# Patient Record
Sex: FEMALE | Race: WHITE | NOT HISPANIC OR LATINO | Employment: FULL TIME | URBAN - METROPOLITAN AREA
[De-identification: names, ages, dates, MRNs, and addresses within clinical notes are randomized per-mention and may not be internally consistent; named-entity substitution may affect disease eponyms.]

---

## 2022-04-20 ENCOUNTER — LAB REQUISITION (OUTPATIENT)
Dept: LAB | Facility: CLINIC | Age: 32
End: 2022-04-20

## 2022-04-20 LAB
MEV IGG SER IA-ACNC: 22.5 AU/ML
MEV IGG SER IA-ACNC: POSITIVE
RUBV IGG SERPL QL IA: 2.41 INDEX
RUBV IGG SERPL QL IA: POSITIVE
VZV IGG SER QL IA: 1144 INDEX
VZV IGG SER QL IA: POSITIVE

## 2022-04-20 PROCEDURE — 86735 MUMPS ANTIBODY: CPT | Performed by: INTERNAL MEDICINE

## 2022-04-20 PROCEDURE — 86787 VARICELLA-ZOSTER ANTIBODY: CPT | Performed by: INTERNAL MEDICINE

## 2022-04-20 PROCEDURE — 86481 TB AG RESPONSE T-CELL SUSP: CPT | Performed by: INTERNAL MEDICINE

## 2022-04-20 PROCEDURE — 86765 RUBEOLA ANTIBODY: CPT | Performed by: INTERNAL MEDICINE

## 2022-04-20 PROCEDURE — 86762 RUBELLA ANTIBODY: CPT | Performed by: INTERNAL MEDICINE

## 2022-04-21 LAB
MUMPS ANTIBODY IGG INSTRUMENT VALUE: 12.6 AU/ML
MUV IGG SER QL IA: POSITIVE

## 2022-04-22 LAB
QUANTIFERON MITOGEN: 10 IU/ML
QUANTIFERON NIL TUBE: 0.04 IU/ML
QUANTIFERON TB1 TUBE: 0.04 IU/ML
QUANTIFERON TB2 TUBE: 0.05

## 2022-04-24 LAB
GAMMA INTERFERON BACKGROUND BLD IA-ACNC: 0.04 IU/ML
M TB IFN-G BLD-IMP: NEGATIVE
M TB IFN-G CD4+ BCKGRND COR BLD-ACNC: 9.96 IU/ML
MITOGEN IGNF BCKGRD COR BLD-ACNC: 0 IU/ML
MITOGEN IGNF BCKGRD COR BLD-ACNC: 0.01 IU/ML

## 2022-05-29 ENCOUNTER — HEALTH MAINTENANCE LETTER (OUTPATIENT)
Age: 32
End: 2022-05-29

## 2022-07-06 ENCOUNTER — OFFICE VISIT (OUTPATIENT)
Dept: FAMILY MEDICINE | Facility: CLINIC | Age: 32
End: 2022-07-06
Payer: COMMERCIAL

## 2022-07-06 VITALS
DIASTOLIC BLOOD PRESSURE: 74 MMHG | SYSTOLIC BLOOD PRESSURE: 122 MMHG | HEART RATE: 81 BPM | WEIGHT: 242.5 LBS | OXYGEN SATURATION: 99 % | BODY MASS INDEX: 38.06 KG/M2 | HEIGHT: 67 IN

## 2022-07-06 DIAGNOSIS — E03.9 HYPOTHYROIDISM, UNSPECIFIED TYPE: ICD-10-CM

## 2022-07-06 DIAGNOSIS — R73.01 IMPAIRED FASTING GLUCOSE: ICD-10-CM

## 2022-07-06 DIAGNOSIS — F33.42 RECURRENT MAJOR DEPRESSION IN COMPLETE REMISSION (H): ICD-10-CM

## 2022-07-06 DIAGNOSIS — E66.01 MORBID OBESITY (H): Primary | ICD-10-CM

## 2022-07-06 DIAGNOSIS — Z98.84 HISTORY OF WEIGHT LOSS SURGERY: ICD-10-CM

## 2022-07-06 DIAGNOSIS — G43.909 MIGRAINE WITHOUT STATUS MIGRAINOSUS, NOT INTRACTABLE, UNSPECIFIED MIGRAINE TYPE: ICD-10-CM

## 2022-07-06 PROBLEM — Z97.5 USES INTRAUTERINE DEVICE FOR BIRTH CONTROL: Status: ACTIVE | Noted: 2021-03-29

## 2022-07-06 PROBLEM — K21.9 GASTROESOPHAGEAL REFLUX DISEASE: Status: RESOLVED | Noted: 2020-09-02 | Resolved: 2022-07-06

## 2022-07-06 PROBLEM — K21.9 GASTROESOPHAGEAL REFLUX DISEASE: Status: ACTIVE | Noted: 2020-09-02

## 2022-07-06 PROBLEM — D06.9 CARCINOMA IN SITU OF CERVIX, UNSPECIFIED: Status: ACTIVE | Noted: 2018-04-03

## 2022-07-06 LAB
HBA1C MFR BLD: 5.4 % (ref 0–5.6)
TSH SERPL DL<=0.005 MIU/L-ACNC: 5.73 UIU/ML (ref 0.3–4.2)

## 2022-07-06 PROCEDURE — 36415 COLL VENOUS BLD VENIPUNCTURE: CPT | Performed by: FAMILY MEDICINE

## 2022-07-06 PROCEDURE — 84443 ASSAY THYROID STIM HORMONE: CPT | Performed by: FAMILY MEDICINE

## 2022-07-06 PROCEDURE — 83036 HEMOGLOBIN GLYCOSYLATED A1C: CPT | Performed by: FAMILY MEDICINE

## 2022-07-06 PROCEDURE — 99204 OFFICE O/P NEW MOD 45 MIN: CPT | Performed by: FAMILY MEDICINE

## 2022-07-06 RX ORDER — TOPIRAMATE 25 MG/1
25 TABLET, FILM COATED ORAL
COMMUNITY
Start: 2020-12-10 | End: 2022-07-06

## 2022-07-06 RX ORDER — CALCIUM CARBONATE 500(1250)
2 TABLET,CHEWABLE ORAL
COMMUNITY

## 2022-07-06 RX ORDER — HYDROXYZINE HYDROCHLORIDE 10 MG/1
10 TABLET, FILM COATED ORAL
COMMUNITY
Start: 2022-03-11

## 2022-07-06 RX ORDER — LEVOTHYROXINE SODIUM 75 UG/1
75 TABLET ORAL DAILY
Qty: 90 TABLET | Refills: 3 | Status: SHIPPED | OUTPATIENT
Start: 2022-07-06

## 2022-07-06 RX ORDER — SUMATRIPTAN 50 MG/1
50 TABLET, FILM COATED ORAL
COMMUNITY
Start: 2021-12-09

## 2022-07-06 RX ORDER — TOPIRAMATE 25 MG/1
50 TABLET, FILM COATED ORAL DAILY
Qty: 180 TABLET | Refills: 1 | Status: SHIPPED | OUTPATIENT
Start: 2022-07-06

## 2022-07-06 RX ORDER — LEVOTHYROXINE SODIUM 75 UG/1
75 TABLET ORAL
COMMUNITY
Start: 2021-06-16 | End: 2022-07-06

## 2022-07-06 RX ORDER — NYSTATIN 100000 [USP'U]/G
1 POWDER TOPICAL
COMMUNITY
Start: 2022-03-28

## 2022-07-06 RX ORDER — BUPROPION HYDROCHLORIDE 100 MG/1
100 TABLET ORAL 2 TIMES DAILY
Qty: 60 TABLET | Refills: 1 | Status: SHIPPED | OUTPATIENT
Start: 2022-07-06

## 2022-07-06 ASSESSMENT — ANXIETY QUESTIONNAIRES
2. NOT BEING ABLE TO STOP OR CONTROL WORRYING: NEARLY EVERY DAY
8. IF YOU CHECKED OFF ANY PROBLEMS, HOW DIFFICULT HAVE THESE MADE IT FOR YOU TO DO YOUR WORK, TAKE CARE OF THINGS AT HOME, OR GET ALONG WITH OTHER PEOPLE?: SOMEWHAT DIFFICULT
7. FEELING AFRAID AS IF SOMETHING AWFUL MIGHT HAPPEN: SEVERAL DAYS
GAD7 TOTAL SCORE: 16
7. FEELING AFRAID AS IF SOMETHING AWFUL MIGHT HAPPEN: SEVERAL DAYS
5. BEING SO RESTLESS THAT IT IS HARD TO SIT STILL: NOT AT ALL
6. BECOMING EASILY ANNOYED OR IRRITABLE: NEARLY EVERY DAY
3. WORRYING TOO MUCH ABOUT DIFFERENT THINGS: NEARLY EVERY DAY
4. TROUBLE RELAXING: NEARLY EVERY DAY
GAD7 TOTAL SCORE: 16
GAD7 TOTAL SCORE: 16
1. FEELING NERVOUS, ANXIOUS, OR ON EDGE: NEARLY EVERY DAY

## 2022-07-06 ASSESSMENT — PATIENT HEALTH QUESTIONNAIRE - PHQ9
SUM OF ALL RESPONSES TO PHQ QUESTIONS 1-9: 9
10. IF YOU CHECKED OFF ANY PROBLEMS, HOW DIFFICULT HAVE THESE PROBLEMS MADE IT FOR YOU TO DO YOUR WORK, TAKE CARE OF THINGS AT HOME, OR GET ALONG WITH OTHER PEOPLE: SOMEWHAT DIFFICULT
SUM OF ALL RESPONSES TO PHQ QUESTIONS 1-9: 9

## 2022-07-06 NOTE — ASSESSMENT & PLAN NOTE
The patient has a history of Tho-en-Y gastric bypass surgery and then was working with the bariatrician in [place] this past year with successful weight loss of about 70 pounds with the support of Contrave.  However, she has been off that medication now for about 6 weeks.  We discussed options for weight loss management and the patient expressed an interest in giving a trial to a GLP-1 agonist which was cost prohibitive with her previous insurance.  I prescribed Saxenda and asked her to follow-up in 6 to 8 weeks regarding her response.  I also referred her to our 24-week healthy lifestyle program to meet with our health  in order to provide some additional goalsetting and support.

## 2022-07-06 NOTE — PROGRESS NOTES
Problem List Items Addressed This Visit        Nervous and Auditory    Migraine headache     Doing well on Topamax 50mg daily; takes Imitrex PRN           Relevant Medications    acetaminophen 500 MG CAPS    SUMAtriptan (IMITREX) 50 MG tablet    topiramate (TOPAMAX) 25 MG tablet    buPROPion (WELLBUTRIN) 100 MG tablet       Digestive    Morbid obesity (H) - Primary     The patient has a history of Tho-en-Y gastric bypass surgery and then was working with the bariatrician in [place] this past year with successful weight loss of about 70 pounds with the support of Contrave.  However, she has been off that medication now for about 6 weeks.  We discussed options for weight loss management and the patient expressed an interest in giving a trial to a GLP-1 agonist which was cost prohibitive with her previous insurance.  I prescribed Saxenda and asked her to follow-up in 6 to 8 weeks regarding her response.  I also referred her to our 24-week healthy lifestyle program to meet with our health  in order to provide some additional goalsetting and support.           Relevant Medications    liraglutide - Weight Management (SAXENDA) 18 MG/3ML pen    insulin pen needle (32G X 4 MM) 32G X 4 MM miscellaneous       Endocrine    Hypothyroidism     Refill given today on her thyroid medication and I also checked an updated TSH.           Relevant Medications    levothyroxine (SYNTHROID/LEVOTHROID) 75 MCG tablet    Other Relevant Orders    TSH    Impaired fasting glucose    Relevant Orders    Hemoglobin A1c (Completed)       Behavioral    Recurrent major depression in complete remission (H)     The patient's depression has been worse over the past few weeks since her moved to the [facility] 2 months ago.  I do think part of this is adjustment related but feel that restarting Wellbutrin would be a good idea based on some of the struggles she is having.           Relevant Medications    hydrOXYzine (ATARAX) 10 MG tablet     "buPROPion (WELLBUTRIN) 100 MG tablet       Other    History of weight loss surgery     S/P Tho-N-Y gastric bypass    Highest weight 310 pounds                     Nithya Chairez is a 32 year old who presents today to establish care.  The patient also needs some refills on some of her medications.  She and her  moved from [facility] up to the [place] about 2 months ago because of his job.  She reports that its been a challenging transition for her in part because she does not love her new job and everything is so new.  The patient has struggled with obesity and did undergo Tho-en-Y gastric bypass surgery.  However, she states that she actually had most significant loss of weight in the past year of about 70 pounds with the support of medical weight management and the medication Contrave.  However, she has been off of the medication now for about 6 weeks.  This combined with the fact that she is struggling a bit more with her mental health with the transition of the move has resulted in about a 20 pound weight regain.  She is very interested in getting back on track and needs some support to do so.  The patient needs refills on her migraine medications and has found topiramate to be very helpful as a prophylactic to keep the headaches at bay.  Imitrex works well when she does get a headache.            Objective    /74 (BP Location: Left arm, Patient Position: Left side, Cuff Size: Adult Large)   Pulse 81   Ht 1.702 m (5' 7\")   Wt 110 kg (242 lb 8 oz)   SpO2 99%   BMI 37.98 kg/m    Body mass index is 37.98 kg/m .  Physical Exam   GENERAL: healthy, alert and no distress        This note has been dictated using voice recognition software. Any grammatical or context distortions are unintentional and inherent to the software      Answers for HPI/ROS submitted by the patient on 7/6/2022  If you checked off any problems, how difficult have these problems made it for you to do your work, " take care of things at home, or get along with other people?: Somewhat difficult  PHQ9 TOTAL SCORE: 9  RICK 7 TOTAL SCORE: 16  Depression/Anxiety: Depression & Anxiety  Since last visit, patient describes the following symptoms:: Anxiety, Constipation, Depression, Dry skin, Fatigue, Weight gain  Status since last visit:: medium  Anxiety since last: : medium  Other associated symptoms of depression:: No  Other associated symotome: : No  Significant life event: : job concerns, housing concerns, other  Anxious:: Yes  Current substance use:: Yes  Weight gain:: 16-20 lbs.  What is the reason for your visit today? : Establish care, weight loss, depression, refill prescriptions  How many servings of fruits and vegetables do you eat daily?: 2-3  On average, how many sweetened beverages do you drink each day (Examples: soda, juice, sweet tea, etc.  Do NOT count diet or artificially sweetened beverages)?: 0  How many minutes a day do you exercise enough to make your heart beat faster?: 9 or less  How many days a week do you exercise enough to make your heart beat faster?: 3 or less  How many days per week do you miss taking your medication?: 1

## 2022-07-06 NOTE — ASSESSMENT & PLAN NOTE
The patient's depression has been worse over the past few weeks since her moved to the [facility] 2 months ago.  I do think part of this is adjustment related but feel that restarting Wellbutrin would be a good idea based on some of the struggles she is having.

## 2022-07-07 RX ORDER — LEVOTHYROXINE SODIUM 88 UG/1
88 TABLET ORAL DAILY
Qty: 90 TABLET | Refills: 1 | Status: SHIPPED | OUTPATIENT
Start: 2022-07-07

## 2022-07-24 ENCOUNTER — MYC REFILL (OUTPATIENT)
Dept: FAMILY MEDICINE | Facility: CLINIC | Age: 32
End: 2022-07-24

## 2022-07-24 ENCOUNTER — MYC MEDICAL ADVICE (OUTPATIENT)
Dept: FAMILY MEDICINE | Facility: CLINIC | Age: 32
End: 2022-07-24

## 2022-07-24 DIAGNOSIS — E66.01 MORBID OBESITY (H): ICD-10-CM

## 2022-07-24 NOTE — TELEPHONE ENCOUNTER
"Routing refill request to provider for review/approval because:  Labs not current:  cr    Last Written Prescription Date:    Last Fill Quantity: ,  # refills:    Last office visit provider:  7/6/22     Requested Prescriptions   Pending Prescriptions Disp Refills     liraglutide - Weight Management (SAXENDA) 18 MG/3ML pen 5 pen 0     Sig: Inject 0.6 mg Subcutaneous daily for 7 days, THEN 1.2 mg daily for 7 days, THEN 1.8 mg daily for 7 days, THEN 2.4 mg daily for 7 days, THEN 3 mg daily for 30 days.       GLP-1 Agonists Protocol Failed - 7/24/2022 10:39 AM        Failed - Order for Saxenda with diagnosis of diabetes        Failed - Normal serum creatinine on file in past 12 months     No lab results found.    Ok to refill medication if creatinine is low          Passed - HgbA1C in past 3 or 6 months     If HgbA1C is 8 or greater, it needs to be on file within the past 3 months.  If less than 8, must be on file within the past 6 months.     Recent Labs   Lab Test 07/06/22  1151   A1C 5.4             Passed - Medication is active on med list        Passed - Patient is age 18 or older        Passed - No active pregnancy on record        Passed - No positive pregnancy test in past 12 months        Passed - Recent (6 mo) or future (30 days) visit within the authorizing provider's specialty     Patient had office visit in the last 6 months or has a visit in the next 30 days with authorizing provider.  See \"Patient Info\" tab in inbasket, or \"Choose Columns\" in Meds & Orders section of the refill encounter.                 Yaa Lilly RN 07/24/22 3:12 PM  "

## 2022-10-03 ENCOUNTER — HEALTH MAINTENANCE LETTER (OUTPATIENT)
Age: 32
End: 2022-10-03

## 2023-02-12 ENCOUNTER — HEALTH MAINTENANCE LETTER (OUTPATIENT)
Age: 33
End: 2023-02-12

## 2024-03-10 ENCOUNTER — HEALTH MAINTENANCE LETTER (OUTPATIENT)
Age: 34
End: 2024-03-10

## 2025-03-16 ENCOUNTER — HEALTH MAINTENANCE LETTER (OUTPATIENT)
Age: 35
End: 2025-03-16